# Patient Record
Sex: MALE | Race: WHITE | NOT HISPANIC OR LATINO | Employment: OTHER | URBAN - METROPOLITAN AREA
[De-identification: names, ages, dates, MRNs, and addresses within clinical notes are randomized per-mention and may not be internally consistent; named-entity substitution may affect disease eponyms.]

---

## 2022-04-01 ENCOUNTER — HOSPITAL ENCOUNTER (EMERGENCY)
Facility: CLINIC | Age: 82
Discharge: HOME | End: 2022-04-01
Attending: EMERGENCY MEDICINE | Admitting: EMERGENCY MEDICINE
Payer: MEDICARE

## 2022-04-01 VITALS
SYSTOLIC BLOOD PRESSURE: 162 MMHG | TEMPERATURE: 98.4 F | RESPIRATION RATE: 18 BRPM | BODY MASS INDEX: 25.55 KG/M2 | HEART RATE: 70 BPM | OXYGEN SATURATION: 100 % | DIASTOLIC BLOOD PRESSURE: 77 MMHG | WEIGHT: 159 LBS | HEIGHT: 66 IN

## 2022-04-01 DIAGNOSIS — S91.051A CAT BITE OF RIGHT ANKLE, INITIAL ENCOUNTER: ICD-10-CM
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
## 2022-04-01 PROCEDURE — 99282 EMERGENCY DEPT VISIT SF MDM: CPT | Performed by: EMERGENCY MEDICINE

## 2022-04-01 RX ORDER — MULTIPLE VITAMINS W/ MINERALS TAB 9MG-400MCG
1 TAB ORAL DAILY
COMMUNITY

## 2022-04-01 RX ORDER — PNV NO.95/FERROUS FUM/FOLIC AC 28MG-0.8MG
1 TABLET ORAL
COMMUNITY
Start: 2021-04-13

## 2022-04-01 RX ORDER — VITAMIN B COMPLEX
1 TABLET ORAL DAILY
COMMUNITY

## 2022-04-01 RX ORDER — CHOLECALCIFEROL (VITAMIN D3) 125 MCG
3000 CAPSULE ORAL
COMMUNITY
Start: 2022-03-09

## 2022-04-01 RX ORDER — ASPIRIN 81 MG/1
81 TABLET, CHEWABLE ORAL
COMMUNITY
Start: 2021-12-19

## 2022-04-01 RX ORDER — PANTOPRAZOLE SODIUM 40 MG/1
40 TABLET, DELAYED RELEASE ORAL
COMMUNITY
Start: 2021-04-13

## 2022-04-01 RX ORDER — LISINOPRIL 40 MG/1
40 TABLET ORAL
COMMUNITY
Start: 2022-03-10

## 2022-04-01 RX ORDER — ALBUTEROL SULFATE 90 UG/1
2 AEROSOL, METERED RESPIRATORY (INHALATION)
COMMUNITY
Start: 2020-10-02

## 2022-04-01 RX ORDER — SENNOSIDES A AND B 8.6 MG/1
TABLET, FILM COATED ORAL
COMMUNITY
Start: 2021-04-13

## 2022-04-01 RX ORDER — ATORVASTATIN CALCIUM 40 MG/1
40 TABLET, FILM COATED ORAL
COMMUNITY
Start: 2022-03-14

## 2022-04-01 RX ORDER — METOPROLOL SUCCINATE 25 MG/1
25 TABLET, EXTENDED RELEASE ORAL
COMMUNITY
Start: 2022-03-14

## 2022-04-01 NOTE — ED PROVIDER NOTES
"  History     Chief Complaint   Patient presents with     Cat Bite     HPI  Ajit Carias is a 81 year old male who bitten by a family cat on his left anterior lower leg.  Bled briskly but that has slowed with a compressive dressing.  Is on Eliquis for chronic A. fib.  Tetanus is up-to-date.  No other injury with the incident.  Cats immunizations are up-to-date until last year as with Covid they were unable to update the immunizations.  The cat does go doors.  Currently is in the home.  No other injury with the incident.  Unfortunately patient was recently hospitalized for C. difficile and did have a fecal transplant to control this.  Since returning home he has not had any diarrhea, abdominal pain or fever.  Allergies:  Allergies   Allergen Reactions     Penicillins Anaphylaxis, Other (See Comments) and Unknown     Other reaction(s): Other (see comments)  \"out of it\"   \"makes me go crazy, I dont know where I'm at\"  \"makes me go crazy, I dont know where I'm at\"         Problem List:    There are no problems to display for this patient.       Past Medical History:    No past medical history on file.    Past Surgical History:    No past surgical history on file.    Family History:    No family history on file.    Social History:  Marital Status:    Social History     Tobacco Use     Smoking status: Not on file     Smokeless tobacco: Not on file   Substance Use Topics     Alcohol use: Not on file     Drug use: Not on file        Medications:    albuterol (PROAIR HFA/PROVENTIL HFA/VENTOLIN HFA) 108 (90 Base) MCG/ACT inhaler  apixaban ANTICOAGULANT (ELIQUIS) 5 MG tablet  aspirin (ASA) 81 MG chewable tablet  atorvastatin (LIPITOR) 40 MG tablet  hypromellose (SYSTANE OVERNIGHT THERAPY) 0.3 % GEL ophthalmic gel  lisinopril (ZESTRIL) 40 MG tablet  metoprolol succinate ER (TOPROL-XL) 25 MG 24 hr tablet  pantoprazole (PROTONIX) 40 MG EC tablet  polyvinyl alcohol-povidone PF (REFRESH) 1.4-0.6 % ophthalmic solution  Prenatal " "Vit-Fe Fumarate-FA (PRENATAL VITAMINS) 28-0.8 MG TABS  senna (SENOKOT) 8.6 MG tablet  vitamin A 3 MG (87476 UNITS) capsule  B Complex Vitamins (VITAMIN B-COMPLEX) TABS  hypromellose (ARTIFICIAL TEARS) 0.5 % SOLN ophthalmic solution  multivitamin w/minerals (MULTI-VITAMIN) tablet          Review of Systems all other systems are reviewed and are negative.    Physical Exam   BP: (!) 157/77  Pulse: 75  Temp: 98.4  F (36.9  C)  Resp: 18  Height: 167.6 cm (5' 6\")  Weight: 72.1 kg (159 lb)  SpO2: 100 %      Physical Exam alert male in mild distress.  Examination of his right leg reveals 2 small puncture wounds and 2 lacerations one is a flap approximately 1 cm in total length and the other is a linear 1.2 cm in total length.  No active bleeding.  He does have pitting edema in both ankles and that is not unusual for him.  There is no redness or lymphangitic spread.  No joint involvement.  The compressive dressing did compress the edema out of the lower leg as noted in the photo.        ED Course                 Procedures       The lower lacerations were anesthetized with Marcaine in a field block.  All of the lesions were then scrubbed with surgical soap.  The 2 larger gaping lesions were then irrigated with normal saline.  The wounds were gaping.  At this point we will leave them open to allow them to drain if they do develop infection.  Antibiotic and dressing was applied       Critical Care time:  none               No results found for this or any previous visit (from the past 24 hour(s)).    Medications - No data to display    Assessments & Plan (with Medical Decision Making)   Ajit Carias is a 81 year old male who bitten by a family cat on his left anterior lower leg.  Bled briskly but that has slowed with a compressive dressing.  Is on Eliquis for chronic A. fib.  Tetanus is up-to-date.  No other injury with the incident.  Cats immunizations are up-to-date until last year as with Covid they were unable to update " the immunizations.  The cat does go doors.  Currently is in the home.  No other injury with the incident.  Unfortunately patient was recently hospitalized for C. difficile and did have a fecal transplant to control this.  Since returning home he has not had any diarrhea, abdominal pain or fever.  On exam patient had puncture and skin tears as noted in the photo and description above.  Treated as above.  After discussion with the patient, and his family they will opt not to do antibiotics at this time unless there is of infection which we discussed.  The immediate return to the ER if that occurs.  Family will continue to monitor the cat.  The daughter is trying to contact the veterinary clinic to see if the rabies is up-to-date.  She was able to get through to the vet clinic and apparently the cat did need a rabies update in 2020.  Discussed giving patient rabies immunoglobulin and vaccine here today.  At this point however the daughter would like to wait as they are able to isolate the cat.  If he has any unusual behavior the patient should really return for the rabies series.  I have reviewed the nursing notes.    I have reviewed the findings, diagnosis, plan and need for follow up with the patient.       New Prescriptions    No medications on file       Final diagnoses:   Cat bite of right ankle, initial encounter       4/1/2022   [facility] EMERGENCY DEPT     Doron Aden MD  04/01/22 1050       Doron Aden MD  04/01/22 1102

## 2022-04-01 NOTE — DISCHARGE INSTRUCTIONS
Keep the area clean and covered.  Check it frequently and if any signs of infection occur such as redness, drainage, or he develops fever or chills return to the ER immediately.  With his history of C. difficile we will hold on antibiotics like we discussed.  If unable to determine the rabies status of the cat please have it isolated for 10 days.  If any abnormal behavior have the cat evaluated immediately.

## 2022-04-01 NOTE — ED TRIAGE NOTES
Patient presents with cat bite punctures to R) lower leg/ ankle. Family cat not UTD on shots. Patient is current on tetanus and is on blood thinners. Bekah Klein RN

## 2022-04-01 NOTE — ED NOTES
Wounds dressed with very thin layer of bacitracin per Dr. Aden. Island dressing and guaze applied. Instructions to Rissa, patient's daughter in law, supplies sent. Bekah Klein RN